# Patient Record
Sex: FEMALE | Race: WHITE | HISPANIC OR LATINO | Employment: FULL TIME | URBAN - METROPOLITAN AREA
[De-identification: names, ages, dates, MRNs, and addresses within clinical notes are randomized per-mention and may not be internally consistent; named-entity substitution may affect disease eponyms.]

---

## 2023-04-10 ENCOUNTER — OFFICE VISIT (OUTPATIENT)
Dept: PRIMARY CARE CLINIC | Facility: CLINIC | Age: 28
End: 2023-04-10
Payer: COMMERCIAL

## 2023-04-10 ENCOUNTER — LAB VISIT (OUTPATIENT)
Dept: LAB | Facility: HOSPITAL | Age: 28
End: 2023-04-10
Attending: NURSE PRACTITIONER
Payer: COMMERCIAL

## 2023-04-10 VITALS
TEMPERATURE: 98 F | WEIGHT: 173.06 LBS | HEART RATE: 72 BPM | SYSTOLIC BLOOD PRESSURE: 118 MMHG | DIASTOLIC BLOOD PRESSURE: 80 MMHG | OXYGEN SATURATION: 97 % | BODY MASS INDEX: 30.66 KG/M2 | HEIGHT: 63 IN

## 2023-04-10 DIAGNOSIS — Z13.1 SCREENING FOR DIABETES MELLITUS: ICD-10-CM

## 2023-04-10 DIAGNOSIS — R53.83 FATIGUE, UNSPECIFIED TYPE: ICD-10-CM

## 2023-04-10 DIAGNOSIS — Z83.3 FAMILY HISTORY OF DIABETES MELLITUS: ICD-10-CM

## 2023-04-10 DIAGNOSIS — Z13.220 ENCOUNTER FOR LIPID SCREENING FOR CARDIOVASCULAR DISEASE: ICD-10-CM

## 2023-04-10 DIAGNOSIS — Z13.6 ENCOUNTER FOR LIPID SCREENING FOR CARDIOVASCULAR DISEASE: ICD-10-CM

## 2023-04-10 DIAGNOSIS — Z83.2 FAMILY HISTORY OF AUTOIMMUNE DISORDER: ICD-10-CM

## 2023-04-10 DIAGNOSIS — Z11.59 ENCOUNTER FOR HEPATITIS C SCREENING TEST FOR LOW RISK PATIENT: ICD-10-CM

## 2023-04-10 DIAGNOSIS — Z11.4 SCREENING FOR HIV (HUMAN IMMUNODEFICIENCY VIRUS): ICD-10-CM

## 2023-04-10 DIAGNOSIS — R63.5 WEIGHT GAIN: ICD-10-CM

## 2023-04-10 DIAGNOSIS — Z76.89 ESTABLISHING CARE WITH NEW DOCTOR, ENCOUNTER FOR: Primary | ICD-10-CM

## 2023-04-10 DIAGNOSIS — Z13.0 SCREENING FOR DEFICIENCY ANEMIA: ICD-10-CM

## 2023-04-10 DIAGNOSIS — R41.840 LACK OF CONCENTRATION: ICD-10-CM

## 2023-04-10 DIAGNOSIS — Z76.89 ESTABLISHING CARE WITH NEW DOCTOR, ENCOUNTER FOR: ICD-10-CM

## 2023-04-10 LAB
ALBUMIN SERPL BCP-MCNC: 3.9 G/DL (ref 3.5–5.2)
ALP SERPL-CCNC: 91 U/L (ref 55–135)
ALT SERPL W/O P-5'-P-CCNC: 37 U/L (ref 10–44)
ANION GAP SERPL CALC-SCNC: 13 MMOL/L (ref 8–16)
AST SERPL-CCNC: 28 U/L (ref 10–40)
BASOPHILS # BLD AUTO: 0.03 K/UL (ref 0–0.2)
BASOPHILS NFR BLD: 0.5 % (ref 0–1.9)
BILIRUB SERPL-MCNC: 0.3 MG/DL (ref 0.1–1)
BUN SERPL-MCNC: 14 MG/DL (ref 6–20)
CALCIUM SERPL-MCNC: 9.7 MG/DL (ref 8.7–10.5)
CHLORIDE SERPL-SCNC: 105 MMOL/L (ref 95–110)
CHOLEST SERPL-MCNC: 165 MG/DL (ref 120–199)
CHOLEST/HDLC SERPL: 3.4 {RATIO} (ref 2–5)
CO2 SERPL-SCNC: 23 MMOL/L (ref 23–29)
CREAT SERPL-MCNC: 0.8 MG/DL (ref 0.5–1.4)
DIFFERENTIAL METHOD: NORMAL
EOSINOPHIL # BLD AUTO: 0.2 K/UL (ref 0–0.5)
EOSINOPHIL NFR BLD: 2.8 % (ref 0–8)
ERYTHROCYTE [DISTWIDTH] IN BLOOD BY AUTOMATED COUNT: 12.8 % (ref 11.5–14.5)
EST. GFR  (NO RACE VARIABLE): >60 ML/MIN/1.73 M^2
ESTIMATED AVG GLUCOSE: 91 MG/DL (ref 68–131)
FERRITIN SERPL-MCNC: 46 NG/ML (ref 20–300)
GLUCOSE SERPL-MCNC: 90 MG/DL (ref 70–110)
HBA1C MFR BLD: 4.8 % (ref 4–5.6)
HCT VFR BLD AUTO: 43.4 % (ref 37–48.5)
HCV AB SERPL QL IA: NORMAL
HDLC SERPL-MCNC: 49 MG/DL (ref 40–75)
HDLC SERPL: 29.7 % (ref 20–50)
HGB BLD-MCNC: 14.2 G/DL (ref 12–16)
HIV 1+2 AB+HIV1 P24 AG SERPL QL IA: NORMAL
IMM GRANULOCYTES # BLD AUTO: 0.02 K/UL (ref 0–0.04)
IMM GRANULOCYTES NFR BLD AUTO: 0.3 % (ref 0–0.5)
IRON SERPL-MCNC: 67 UG/DL (ref 30–160)
LDLC SERPL CALC-MCNC: 102.4 MG/DL (ref 63–159)
LYMPHOCYTES # BLD AUTO: 1.5 K/UL (ref 1–4.8)
LYMPHOCYTES NFR BLD: 24.1 % (ref 18–48)
MCH RBC QN AUTO: 30.9 PG (ref 27–31)
MCHC RBC AUTO-ENTMCNC: 32.7 G/DL (ref 32–36)
MCV RBC AUTO: 95 FL (ref 82–98)
MONOCYTES # BLD AUTO: 0.5 K/UL (ref 0.3–1)
MONOCYTES NFR BLD: 8 % (ref 4–15)
NEUTROPHILS # BLD AUTO: 3.9 K/UL (ref 1.8–7.7)
NEUTROPHILS NFR BLD: 64.3 % (ref 38–73)
NONHDLC SERPL-MCNC: 116 MG/DL
NRBC BLD-RTO: 0 /100 WBC
PLATELET # BLD AUTO: 312 K/UL (ref 150–450)
PMV BLD AUTO: 9.2 FL (ref 9.2–12.9)
POTASSIUM SERPL-SCNC: 4.1 MMOL/L (ref 3.5–5.1)
PROT SERPL-MCNC: 7.6 G/DL (ref 6–8.4)
RBC # BLD AUTO: 4.59 M/UL (ref 4–5.4)
SATURATED IRON: 15 % (ref 20–50)
SODIUM SERPL-SCNC: 141 MMOL/L (ref 136–145)
T4 FREE SERPL-MCNC: 0.87 NG/DL (ref 0.71–1.51)
TOTAL IRON BINDING CAPACITY: 450 UG/DL (ref 250–450)
TRANSFERRIN SERPL-MCNC: 304 MG/DL (ref 200–375)
TRIGL SERPL-MCNC: 68 MG/DL (ref 30–150)
TSH SERPL DL<=0.005 MIU/L-ACNC: 1.7 UIU/ML (ref 0.4–4)
WBC # BLD AUTO: 6.1 K/UL (ref 3.9–12.7)

## 2023-04-10 PROCEDURE — 3008F BODY MASS INDEX DOCD: CPT | Mod: CPTII,S$GLB,, | Performed by: NURSE PRACTITIONER

## 2023-04-10 PROCEDURE — 1160F PR REVIEW ALL MEDS BY PRESCRIBER/CLIN PHARMACIST DOCUMENTED: ICD-10-PCS | Mod: CPTII,S$GLB,, | Performed by: NURSE PRACTITIONER

## 2023-04-10 PROCEDURE — 1159F MED LIST DOCD IN RCRD: CPT | Mod: CPTII,S$GLB,, | Performed by: NURSE PRACTITIONER

## 2023-04-10 PROCEDURE — 80053 COMPREHEN METABOLIC PANEL: CPT | Performed by: NURSE PRACTITIONER

## 2023-04-10 PROCEDURE — 1160F RVW MEDS BY RX/DR IN RCRD: CPT | Mod: CPTII,S$GLB,, | Performed by: NURSE PRACTITIONER

## 2023-04-10 PROCEDURE — 3079F PR MOST RECENT DIASTOLIC BLOOD PRESSURE 80-89 MM HG: ICD-10-PCS | Mod: CPTII,S$GLB,, | Performed by: NURSE PRACTITIONER

## 2023-04-10 PROCEDURE — 99385 PR PREVENTIVE VISIT,NEW,18-39: ICD-10-PCS | Mod: S$GLB,,, | Performed by: NURSE PRACTITIONER

## 2023-04-10 PROCEDURE — 83036 HEMOGLOBIN GLYCOSYLATED A1C: CPT | Performed by: NURSE PRACTITIONER

## 2023-04-10 PROCEDURE — 99999 PR PBB SHADOW E&M-EST. PATIENT-LVL III: CPT | Mod: PBBFAC,,, | Performed by: NURSE PRACTITIONER

## 2023-04-10 PROCEDURE — 86803 HEPATITIS C AB TEST: CPT | Performed by: NURSE PRACTITIONER

## 2023-04-10 PROCEDURE — 84439 ASSAY OF FREE THYROXINE: CPT | Performed by: NURSE PRACTITIONER

## 2023-04-10 PROCEDURE — 84443 ASSAY THYROID STIM HORMONE: CPT | Performed by: NURSE PRACTITIONER

## 2023-04-10 PROCEDURE — 99385 PREV VISIT NEW AGE 18-39: CPT | Mod: S$GLB,,, | Performed by: NURSE PRACTITIONER

## 2023-04-10 PROCEDURE — 3008F PR BODY MASS INDEX (BMI) DOCUMENTED: ICD-10-PCS | Mod: CPTII,S$GLB,, | Performed by: NURSE PRACTITIONER

## 2023-04-10 PROCEDURE — 3074F SYST BP LT 130 MM HG: CPT | Mod: CPTII,S$GLB,, | Performed by: NURSE PRACTITIONER

## 2023-04-10 PROCEDURE — 84466 ASSAY OF TRANSFERRIN: CPT | Performed by: NURSE PRACTITIONER

## 2023-04-10 PROCEDURE — 36415 COLL VENOUS BLD VENIPUNCTURE: CPT | Performed by: NURSE PRACTITIONER

## 2023-04-10 PROCEDURE — 82728 ASSAY OF FERRITIN: CPT | Performed by: NURSE PRACTITIONER

## 2023-04-10 PROCEDURE — 3079F DIAST BP 80-89 MM HG: CPT | Mod: CPTII,S$GLB,, | Performed by: NURSE PRACTITIONER

## 2023-04-10 PROCEDURE — 86038 ANTINUCLEAR ANTIBODIES: CPT | Performed by: NURSE PRACTITIONER

## 2023-04-10 PROCEDURE — 3074F PR MOST RECENT SYSTOLIC BLOOD PRESSURE < 130 MM HG: ICD-10-PCS | Mod: CPTII,S$GLB,, | Performed by: NURSE PRACTITIONER

## 2023-04-10 PROCEDURE — 85025 COMPLETE CBC W/AUTO DIFF WBC: CPT | Performed by: NURSE PRACTITIONER

## 2023-04-10 PROCEDURE — 99999 PR PBB SHADOW E&M-EST. PATIENT-LVL III: ICD-10-PCS | Mod: PBBFAC,,, | Performed by: NURSE PRACTITIONER

## 2023-04-10 PROCEDURE — 80061 LIPID PANEL: CPT | Performed by: NURSE PRACTITIONER

## 2023-04-10 PROCEDURE — 87389 HIV-1 AG W/HIV-1&-2 AB AG IA: CPT | Performed by: NURSE PRACTITIONER

## 2023-04-10 PROCEDURE — 1159F PR MEDICATION LIST DOCUMENTED IN MEDICAL RECORD: ICD-10-PCS | Mod: CPTII,S$GLB,, | Performed by: NURSE PRACTITIONER

## 2023-04-10 NOTE — PROGRESS NOTES
"[facility] Note    Chief Complaint      Chief Complaint   Patient presents with    Establish Care     History of Present Illness      Ignacia Gonzales is a 28 y.o. female patient with no chronic conditions known who is new to me and presents today for establish care visit.  No concerns of shortness of breath or chest pain, no concerns of bowel or bladder function.  Regular diet, stays hydrated and active.      Comes to appt alone  Practices safety measures such as wearing seatbelts    Patient is anesthesia resident at \[facility]\"", is from [place] and here for school  Girlfriend/fiance' is going to anesthesia here at \[facility]\"" as well.    Labs ordered  Eye exams- defer  [facility]- [facility]      Vaccines: needs to [place] immunizations [place] out of state to portal  Covid-pfizer x 1  Tdap-  Flu shot-10/2022      New problems:  Concerns of weight gain, fatigue, large fatty lump to base of neck/upper back "buffalo hump". Pt noticed about 6 months ago.     Mom- has several autoimmune diseases  Maternal grandmother- diabetes  Maternal uncles- diabetes    Health Maintenance   Topic Date Due    Hepatitis C Screening  Never done    Lipid Panel  Never done    TETANUS VACCINE  Never done       No past medical history on file.    Past Surgical History:   Procedure Laterality Date    KNEE ARTHROSCOPY Bilateral        family history includes Arthritis in her mother; Depression in her maternal grandmother and mother; Diabetes in her maternal grandmother and maternal uncle.    Social History     Tobacco Use    Smoking status: Never     Passive exposure: Never    Smokeless tobacco: Never   Substance Use Topics    Alcohol use: Yes     Comment: 2-3 drinks a month    Drug use: Never       Review of Systems   Constitutional:  Positive for malaise/fatigue. Negative for chills and fever.   HENT:  Negative for congestion, sinus pain and sore throat.    Eyes:  Negative for blurred vision.   Respiratory:  Negative for " "cough, shortness of breath and wheezing.    Cardiovascular:  Negative for chest pain, palpitations and leg swelling.   Gastrointestinal:  Negative for abdominal pain, constipation, diarrhea, nausea and vomiting.   Genitourinary:  Negative for dysuria.   Musculoskeletal:  Negative for myalgias.   Skin:  Negative for rash.   Neurological:  Negative for dizziness, weakness and headaches.   Psychiatric/Behavioral:  Negative for depression. The patient is not nervous/anxious.       Outpatient Encounter Medications as of 4/10/2023   Medication Sig Dispense Refill    [DISCONTINUED] erythromycin (ROMYCIN) ophthalmic ointment apply topically to the right eye four times a day 3.5 g 0     No facility-administered encounter medications on file as of 4/10/2023.        Review of patient's allergies indicates:  No Known Allergies    Physical Exam      Vital Signs  Temp: 98.2 °F (36.8 °C)  Pulse: 72  SpO2: 97 %  BP: 118/80  BP Location: Right arm  Patient Position: Sitting  Height and Weight  Height: 5' 3" (160 cm)  Weight: 78.5 kg (173 lb 1 oz)  BSA (Calculated - sq m): 1.87 sq meters  BMI (Calculated): 30.7  Weight in (lb) to have BMI = 25: 140.8    Physical Exam  Vitals and nursing note reviewed.   Constitutional:       General: She is not in acute distress.     Appearance: Normal appearance. She is well-developed. She is not ill-appearing.   HENT:      Head: Normocephalic and atraumatic.      Right Ear: External ear normal.      Left Ear: External ear normal.   Eyes:      Conjunctiva/sclera: Conjunctivae normal.      Pupils: Pupils are equal, round, and reactive to light.   Neck:      Thyroid: No thyromegaly.      Vascular: No JVD.      Trachea: No tracheal deviation.   Cardiovascular:      Rate and Rhythm: Normal rate and regular rhythm.      Heart sounds: Normal heart sounds. No murmur heard.  Pulmonary:      Effort: Pulmonary effort is normal.      Breath sounds: Normal breath sounds.   Chest:      Chest wall: No tenderness. "   Abdominal:      General: Bowel sounds are normal.      Palpations: Abdomen is soft.      Tenderness: There is no abdominal tenderness. There is no guarding.   Musculoskeletal:         General: Normal range of motion.      Cervical back: Normal range of motion and neck supple.   Lymphadenopathy:      Cervical: No cervical adenopathy.   Skin:     General: Skin is warm and dry.   Neurological:      General: No focal deficit present.      Mental Status: She is alert and oriented to person, place, and time.   Psychiatric:         Mood and Affect: Mood normal.         Behavior: Behavior normal.         Thought Content: Thought content normal.         Judgment: Judgment normal.        Laboratory:  CBC:  No results found for: WBC, RBC, HGB, HCT, PLT, MCV, MCH, MCHC  CMP:  No results found for: GLU, CALCIUM, ALBUMIN, PROT, NA, K, CO2, CL, BUN, ALKPHOS, ALT, AST, BILITOT  URINALYSIS:  No results found for: COLORU, CLARITYU, SPECGRAV, PHUR, PROTEINUA, GLUCOSEU, BILIRUBINCON, BLOODU, WBCU, RBCU, BACTERIA, MUCUS, NITRITE, LEUKOCYTESUR, UROBILINOGEN, HYALINECASTS   LIPIDS:  No results found for: TSH, HDL, CHOL, TRIG, LDLCALC, CHOLHDL, NONHDLCHOL, TOTALCHOLEST  TSH:  No results found for: TSH  A1C:  No results found for: HGBA1C      Assessment/Plan     Ignacia Gonzales is a 28 y.o.female with:    Establishing care with new doctor, encounter for  -     CBC Auto Differential; Future; Expected date: 04/10/2023  -     Comprehensive Metabolic Panel; Future; Expected date: 04/10/2023  -     Lipid Panel; Future; Expected date: 04/10/2023  -     T4, Free; Future; Expected date: 04/10/2023  -     TSH; Future; Expected date: 04/10/2023  -     Iron and TIBC; Future; Expected date: 04/10/2023  -     Ferritin; Future; Expected date: 04/10/2023  -     Hepatitis C Antibody; Future; Expected date: 04/10/2023  -     HIV 1/2 Ag/Ab (4th Gen); Future; Expected date: 04/10/2023  -     Cortisol, Urine, Free [facility]; 24 Hours; Future; Expected date:  04/10/2023    Fatigue, unspecified type  -     CBC Auto Differential; Future; Expected date: 04/10/2023  -     Comprehensive Metabolic Panel; Future; Expected date: 04/10/2023  -     Lipid Panel; Future; Expected date: 04/10/2023  -     T4, Free; Future; Expected date: 04/10/2023  -     TSH; Future; Expected date: 04/10/2023  -     Iron and TIBC; Future; Expected date: 04/10/2023  -     Ferritin; Future; Expected date: 04/10/2023  -     Hepatitis C Antibody; Future; Expected date: 04/10/2023  -     HIV 1/2 Ag/Ab (4th Gen); Future; Expected date: 04/10/2023  -     Cortisol, Urine, Free [facility]; 24 Hours; Future; Expected date: 04/10/2023    Weight gain  -     CBC Auto Differential; Future; Expected date: 04/10/2023  -     Comprehensive Metabolic Panel; Future; Expected date: 04/10/2023  -     Lipid Panel; Future; Expected date: 04/10/2023  -     T4, Free; Future; Expected date: 04/10/2023  -     TSH; Future; Expected date: 04/10/2023  -     Iron and TIBC; Future; Expected date: 04/10/2023  -     Ferritin; Future; Expected date: 04/10/2023  -     Hepatitis C Antibody; Future; Expected date: 04/10/2023  -     HIV 1/2 Ag/Ab (4th Gen); Future; Expected date: 04/10/2023  -     Cortisol, Urine, Free [facility]; 24 Hours; Future; Expected date: 04/10/2023    Encounter for lipid screening for cardiovascular disease  -     CBC Auto Differential; Future; Expected date: 04/10/2023  -     Comprehensive Metabolic Panel; Future; Expected date: 04/10/2023  -     Lipid Panel; Future; Expected date: 04/10/2023  -     T4, Free; Future; Expected date: 04/10/2023  -     TSH; Future; Expected date: 04/10/2023  -     Iron and TIBC; Future; Expected date: 04/10/2023  -     Ferritin; Future; Expected date: 04/10/2023  -     Hepatitis C Antibody; Future; Expected date: 04/10/2023  -     HIV 1/2 Ag/Ab (4th Gen); Future; Expected date: 04/10/2023  -     Cortisol, Urine, Free [facility]; 24 Hours; Future; Expected date: 04/10/2023    Screening for  deficiency anemia  -     CBC Auto Differential; Future; Expected date: 04/10/2023  -     Comprehensive Metabolic Panel; Future; Expected date: 04/10/2023  -     Lipid Panel; Future; Expected date: 04/10/2023  -     T4, Free; Future; Expected date: 04/10/2023  -     TSH; Future; Expected date: 04/10/2023  -     Iron and TIBC; Future; Expected date: 04/10/2023  -     Ferritin; Future; Expected date: 04/10/2023  -     Hepatitis C Antibody; Future; Expected date: 04/10/2023  -     HIV 1/2 Ag/Ab (4th Gen); Future; Expected date: 04/10/2023  -     Cortisol, Urine, Free [facility]; 24 Hours; Future; Expected date: 04/10/2023    Encounter for hepatitis C screening test for low risk patient  -     CBC Auto Differential; Future; Expected date: 04/10/2023  -     Comprehensive Metabolic Panel; Future; Expected date: 04/10/2023  -     Lipid Panel; Future; Expected date: 04/10/2023  -     T4, Free; Future; Expected date: 04/10/2023  -     TSH; Future; Expected date: 04/10/2023  -     Iron and TIBC; Future; Expected date: 04/10/2023  -     Ferritin; Future; Expected date: 04/10/2023  -     Hepatitis C Antibody; Future; Expected date: 04/10/2023  -     HIV 1/2 Ag/Ab (4th Gen); Future; Expected date: 04/10/2023  -     Cortisol, Urine, Free [facility]; 24 Hours; Future; Expected date: 04/10/2023    Screening for HIV (human immunodeficiency virus)  -     CBC Auto Differential; Future; Expected date: 04/10/2023  -     Comprehensive Metabolic Panel; Future; Expected date: 04/10/2023  -     Lipid Panel; Future; Expected date: 04/10/2023  -     T4, Free; Future; Expected date: 04/10/2023  -     TSH; Future; Expected date: 04/10/2023  -     Iron and TIBC; Future; Expected date: 04/10/2023  -     Ferritin; Future; Expected date: 04/10/2023  -     Hepatitis C Antibody; Future; Expected date: 04/10/2023  -     HIV 1/2 Ag/Ab (4th Gen); Future; Expected date: 04/10/2023  -     Cortisol, Urine, Free [facility]; 24 Hours; Future; Expected date:  04/10/2023          Health Maintenance Due   Topic Date Due    Hepatitis C Screening  Never done    Cervical Cancer Screening  Never done    Lipid Panel  Never done    HIV Screening  Never done    TETANUS VACCINE  Never done    COVID-19 Vaccine (2 - Pfizer series) 12/25/2021        I spent 40 minutes on the day of this encounter for preparing for, evaluating, treating, and managing this patient.      -Continue current medications and maintain follow up with specialists.  Return to clinic in 1 year or sooner for any concerns   No follow-ups on file.       LORIE BellC  [facility]

## 2023-04-11 LAB — ANA SER QL IF: NORMAL

## 2024-02-15 ENCOUNTER — TELEPHONE (OUTPATIENT)
Dept: FAMILY MEDICINE | Facility: CLINIC | Age: 29
End: 2024-02-15
Payer: COMMERCIAL

## 2024-02-15 NOTE — TELEPHONE ENCOUNTER
Please contact patient.  Patient is new to weight loss with me. Will need to be rescheduled to a new patient 40 minute weight loss appt.